# Patient Record
Sex: FEMALE | Race: OTHER | URBAN - METROPOLITAN AREA
[De-identification: names, ages, dates, MRNs, and addresses within clinical notes are randomized per-mention and may not be internally consistent; named-entity substitution may affect disease eponyms.]

---

## 2020-09-15 ENCOUNTER — NURSE ONLY (OUTPATIENT)
Dept: OBGYN CLINIC | Age: 25
End: 2020-09-15

## 2020-09-15 ENCOUNTER — HOSPITAL ENCOUNTER (OUTPATIENT)
Age: 25
Setting detail: SPECIMEN
Discharge: HOME | End: 2020-09-15
Payer: MEDICAID

## 2020-09-15 VITALS — DIASTOLIC BLOOD PRESSURE: 60 MMHG | SYSTOLIC BLOOD PRESSURE: 110 MMHG

## 2020-09-15 LAB
ABO/RH: NORMAL
ABSOLUTE EOS #: 0.05 K/UL (ref 0–0.44)
ABSOLUTE IMMATURE GRANULOCYTE: <0.03 K/UL (ref 0–0.3)
ABSOLUTE LYMPH #: 1.61 K/UL (ref 1.1–3.7)
ABSOLUTE MONO #: 0.61 K/UL (ref 0.1–1.2)
ANTIBODY SCREEN: NEGATIVE
BASOPHILS # BLD: 1 % (ref 0–2)
BASOPHILS ABSOLUTE: 0.04 K/UL (ref 0–0.2)
DIFFERENTIAL TYPE: ABNORMAL
EOSINOPHILS RELATIVE PERCENT: 1 % (ref 1–4)
HCT VFR BLD CALC: 40.4 % (ref 36.3–47.1)
HEMOGLOBIN: 13.1 G/DL (ref 11.9–15.1)
HEPATITIS B SURFACE ANTIGEN: NONREACTIVE
HIV AG/AB: NONREACTIVE
IMMATURE GRANULOCYTES: 0 %
LYMPHOCYTES # BLD: 21 % (ref 24–43)
MCH RBC QN AUTO: 29.8 PG (ref 25.2–33.5)
MCHC RBC AUTO-ENTMCNC: 32.4 G/DL (ref 28.4–34.8)
MCV RBC AUTO: 92 FL (ref 82.6–102.9)
MONOCYTES # BLD: 8 % (ref 3–12)
NRBC AUTOMATED: 0 PER 100 WBC
PDW BLD-RTO: 12.6 % (ref 11.8–14.4)
PLATELET # BLD: 287 K/UL (ref 138–453)
PLATELET ESTIMATE: ABNORMAL
PMV BLD AUTO: 10.6 FL (ref 8.1–13.5)
RBC # BLD: 4.39 M/UL (ref 3.95–5.11)
RBC # BLD: ABNORMAL 10*6/UL
RUBV IGG SER QL: 18.2 IU/ML
SEG NEUTROPHILS: 69 % (ref 36–65)
SEGMENTED NEUTROPHILS ABSOLUTE COUNT: 5.51 K/UL (ref 1.5–8.1)
T. PALLIDUM, IGG: NONREACTIVE
WBC # BLD: 7.8 K/UL (ref 3.5–11.3)
WBC # BLD: ABNORMAL 10*3/UL

## 2020-09-15 NOTE — PROGRESS NOTES
OB intake and lab work ordered. Hx of 17p injections in prior pregnancy, had a full term delivery. Hx of 2 SAB's. Delivered in [facility] in 2017, vag, full term. All history placed. 10w 6d on US today.     LMP possibly in June

## 2020-09-24 LAB — CYSTIC FIBROSIS: NORMAL

## 2020-10-07 ENCOUNTER — INITIAL PRENATAL (OUTPATIENT)
Dept: OBGYN CLINIC | Age: 25
End: 2020-10-07
Payer: MEDICAID

## 2020-10-07 ENCOUNTER — HOSPITAL ENCOUNTER (OUTPATIENT)
Age: 25
Setting detail: SPECIMEN
Discharge: HOME | End: 2020-10-07
Payer: MEDICAID

## 2020-10-07 VITALS
BODY MASS INDEX: 26.22 KG/M2 | HEIGHT: 63 IN | WEIGHT: 148 LBS | SYSTOLIC BLOOD PRESSURE: 112 MMHG | DIASTOLIC BLOOD PRESSURE: 60 MMHG

## 2020-10-07 LAB
AMPHETAMINE SCREEN URINE: NEGATIVE
BARBITURATE SCREEN URINE: NEGATIVE
BENZODIAZEPINE SCREEN, URINE: NEGATIVE
BILIRUBIN URINE: NEGATIVE
BUPRENORPHINE URINE: NORMAL
CANNABINOID SCREEN URINE: NEGATIVE
COCAINE METABOLITE, URINE: NEGATIVE
COLOR: YELLOW
COMMENT UA: NORMAL
GLUCOSE URINE: NEGATIVE
KETONES, URINE: NEGATIVE
LEUKOCYTE ESTERASE, URINE: NEGATIVE
MDMA URINE: NORMAL
METHADONE SCREEN, URINE: NEGATIVE
METHAMPHETAMINE, URINE: NORMAL
NITRITE, URINE: NEGATIVE
OPIATES, URINE: NEGATIVE
OXYCODONE SCREEN URINE: NEGATIVE
PH UA: 7.5 (ref 5–8)
PHENCYCLIDINE, URINE: NEGATIVE
PROPOXYPHENE, URINE: NORMAL
PROTEIN UA: NEGATIVE
SPECIFIC GRAVITY UA: 1 (ref 1–1.03)
TEST INFORMATION: NORMAL
TRICYCLIC ANTIDEPRESSANTS, UR: NORMAL
TURBIDITY: CLEAR
URINE HGB: NEGATIVE
UROBILINOGEN, URINE: NORMAL

## 2020-10-07 PROCEDURE — G8427 DOCREV CUR MEDS BY ELIG CLIN: HCPCS | Performed by: NURSE PRACTITIONER

## 2020-10-07 PROCEDURE — G8484 FLU IMMUNIZE NO ADMIN: HCPCS | Performed by: NURSE PRACTITIONER

## 2020-10-07 PROCEDURE — 1036F TOBACCO NON-USER: CPT | Performed by: NURSE PRACTITIONER

## 2020-10-07 PROCEDURE — 99203 OFFICE O/P NEW LOW 30 MIN: CPT | Performed by: NURSE PRACTITIONER

## 2020-10-07 PROCEDURE — G8419 CALC BMI OUT NRM PARAM NOF/U: HCPCS | Performed by: NURSE PRACTITIONER

## 2020-10-07 NOTE — PROGRESS NOTES
OB History    Para Term  AB Living   4 1 1   2 1   SAB TAB Ectopic Molar Multiple Live Births   2                # Outcome Date GA Lbr Guillermo/2nd Weight Sex Delivery Anes PTL Lv   4 Current            3 Term 2017    F          Birth Comments: [address] [facility]   2 SAB            1 SAB               Prudy Monday is being seen today for her new obstetrical visit- she had nurse intake completed at 10 weeks gestation with dating US. The pregnancy is  a planned pregnancy. She is  accepting at this time. Her last period was No LMP recorded (lmp unknown). Patient is pregnant. .  This was not a sure and definite menses. She was not on OCP at conception. Gestation 14w0d  Estimated Date of Delivery: 21  Last PAP 3 years ago, hx abnormal: denies    - Hx SAB X2. Delivered vaginal full term. In [facility]. Her and boyfriend and daughter moved her less than a year ago for his job.      HAd hx 17- P injections with previous pregnancy, she has consult scheduled with [facility] on 10/21/20            Plans to deliver at: unsure  Plans to breastfeed: no  SO/Partner:  Boyfriend- Sabinolian Involved:  Yes same FOB as their daughter  Patient Ethnicity:  FOB Ethnicity:       Occupation:  Does not work outside the home       Pets:  denies    Teratogen exposure since LMP: (OTC/prescription/ETOH/drugs):  denies    History of prior GBS-infected child:  no  Recent travel outside of country (partner also):  no    Known COVID/Zika exposure (partner also): no  Any history of genetic or chromosomal aberations in herself the father to be or their respective families: no  Any histories of spina bifida or abdominal wall defects; omphalocele's or gastroschisis no  Hx Hypothyroidism: deneis  Hx preeclampsia or HTN:  denies  Hx PPD: denies  Hx Diabetes: denies  Hx GDM: denies  First degree relative with diabetes: denies       No Known Allergies  Current Outpatient Medications   Medication Sig Dispense Refill    Prenatal Vit w/[id number] (PNV PO) Take by mouth       No current facility-administered medications for this visit. No past medical history on file. History reviewed. No pertinent surgical history. Swelling: no  Bleeding: no  Discharge: no   Dysuria: no  Nausea: no- has improved,  encouraged small frequent meals, and vitamin B6 and unisom if needed. Heartburn: no  Epigastric pain: no       Office protocol reviewed, After hours number provided. Diet and exercise reveiwed  Warning signs reviewed  Testing reviewed     Q&A  Refer no had declined  1st trimester screen, info provided for screening, Discussed dates and orders for Anatomy USd and fetal echo if indicated. Breastfeeding education. She verbally consented to HIV testing and drug screening. She was counseled on Toxoplasmosis/Listeriosis (cats/raw meat). Prenatal Vitamins recommended. Pelvic exam completed PAP and cultures obtained and sent. Prenatal labs and dating us completed- besides urine which was sent today        RV prn/ 4 weeks     Of the 30 minute duration appointment visit, Thierno Bryson CNP spent at least 50% of the face-to-face time in counseling, explanation of diagnosis, planning of further management, and answering all questions.

## 2020-10-07 NOTE — PATIENT INSTRUCTIONS
After Hours Number: You can call the office [phone]  or [phone]  Call if you have:  1. Bleeding like a period  2. Cramps or contractions greater than 2 hours  3. If you are leaking fluid  4. If you've a fever greater than 100°  5. If you feel as if baby is not moving  6. If you have continuous vomiting over 3-4 hours        Patient was counseled on weight gain in pregnancy with the following recommendation made based on her BMI:     Singletons:  BMI <18.5: 28-40 lbs weight gain  BMI 18.5-24.9: 25-35 lbs weight gain   BMI 25-29.9: 15-25 lbs weight gain  BMI >/= 30: 11-20 lbs weight gain    Up to 16 weeks around 1 pound a month  16-28 weeks- 2-4 pounds a month  >28 weeks - around 1 pound a week due to increased growth and blood volume      Twins:  BMI <18.5: no reccomendations  BMI 18.5-24.9: 37-45 lbs weight gain  BMI 25-29.9: 31-50 lbs weight gain  BMI >/= 30: 25-42 lbs weight gain    During Pregnancy: Exercises  Your Care Instructions  Here are some examples of exercises to do during your pregnancy. Start each exercise slowly. Ease off the exercise if you start to have pain. Your doctor or physical therapist will tell you when you can start these exercises and which ones will work best for you. How to do the exercises  Neck rotation    1. Sit in a firm chair, or stand up straight. 2. Keeping your chin level, turn your head to the right, and hold for 15 to 30 seconds. 3. Turn your head to the left and hold for 15 to 30 seconds. 4. Repeat 2 to 4 times to each side. Forward neck flexion    1. Sit in a firm chair, or stand up straight. 2. Bend your head forward. 3. Hold for 15 to 30 seconds. 4. Repeat 2 to 4 times. Back press    1. Place your feet 10 to 12 inches from the wall. 2. Rest your back flat against the wall and slide down the wall until your knees are slightly bent. 3. Press your lower back against the wall by pulling in your stomach muscles.   4. Hold for 6 seconds, and then relax your stomach muscles and slide back up the wall. 5. Repeat 8 to 12 times. Full body twist    1. Sit with your legs crossed. 2. Reach your left hand toward your right foot, and place your right hand at your side for support. 3. Slowly twist your torso to your right. 4. Switch your hands and twist to your left. 5. Repeat 2 to 4 times. Pelvic rocking    1. Kneeling on hands and knees, place your hands directly under your shoulders and your knees under your hips. 2. Breathe in deeply. Tuck your head downward and round your back up, making a curve with your back in the shape of the letter C. Hold this position for a count of 6.  3. Breathe out slowly and bring your head back up. Relax, keeping your back straight (don't allow it to curve toward the floor). Hold this for a count of 6.  4. Do this exercise 8 times or to your comfort level. Pelvic tilt    1. Lie on your back. 2. Keep your knees relaxed. 3. Tighten your belly and buttocks muscles. 4. At the same time, gently shift your pelvis upward. This should flatten the curve in your back. 5. Hold for 6 seconds and then relax. 6. Gradually increase the number of tilts you do each day, to your comfort level. Backward stretch    1. Kneel on hands and knees with your knees 8 to 10 inches apart, hands directly under your shoulders, and arms and back straight. 2. Keeping your arms straight, slowly lower your buttocks toward your heels and tuck your head toward your knees. Hold for 15 to 30 seconds. 3. Slowly return to the kneeling position. 4. Repeat 2 to 4 times. Forward bend    1. Sit comfortably in a chair, with your arms relaxed. 2. Slowly bend forward, allowing your arms to hang down in front of you. Lean only as far as you can without feeling discomfort or pressure on your belly. 3. Hold for 15 to 30 seconds and then slowly sit up straight. 4. Repeat 2 to 4 times or to your comfort level. Leg lift crawl    1.  Kneeling on hands and knees, place your hands directly under your shoulders and straighten your arms. 2. Tighten your belly muscles by pulling in your belly button toward your spine. Be sure you continue to breathe normally and do not hold your breath. 3. Lift your left knee and bring it toward your elbow. 4. Slowly extend your leg behind you without completely straightening it. Be careful not to let your hip drop down. Avoid arching your back. 5. Hold your leg behind you for about 6 seconds. 6. Return to your starting position. 7. Do the same exercise with your other leg. 8. Repeat 8 to 12 times for each leg. Tailor sitting    1. Sit on the floor. 2. Bring your feet close to your body while crossing your ankles. 3. Hold this position for as long as you are comfortable. Tailor stretching    1. Sit on the floor with your back straight, legs about 12 inches apart, and feet relaxed outward. 2. Stretch your hands forward toward your left foot, then sit up. 3. Stretch your hands straight forward, then sit up. 4. Stretch your hands forward toward your right foot, then sit up. 5. Hold each stretch for 15 to 30 seconds. 6. Repeat 2 to 4 times. Follow-up care is a key part of your treatment and safety. Be sure to make and go to all appointments, and call your doctor if you are having problems. It's also a good idea to know your test results and keep a list of the medicines you take. Where can you learn more? Go to https://Voxel (Internap)zairaMarketLive.Physician Practice Revenue Solutions. org and sign in to your [employer] account. Enter S779 in the [facility] box to learn more about \"During Pregnancy: Exercises. \"     If you do not have an account, please click on the \"Sign Up Now\" link. Current as of: September 5, 2018  Content Version: 12.0  © 2647-5883 Healthwise, Incorporated. Care instructions adapted under license by [facility] ([facility]).  If you have questions about a medical condition or this instruction, always ask your healthcare professional. [employer], [facility] disclaims any warranty or liability for your use of this information. Nutrition During Pregnancy: Care Instructions  Your Care Instructions    Healthy eating when you are pregnant is important for you and your baby. It can help you feel well and have a successful pregnancy and delivery. During pregnancy your nutrition needs increase. Even if you have excellent eating habits, your doctor may recommend a multivitamin to make sure you get enough iron and folic acid. Many pregnant women wonder how much weight they should gain. In general, women who were at a healthy weight before they became pregnant should gain between 25 and 35 pounds. Women who were overweight before pregnancy are usually advised to gain 15 to 25 pounds. Women who were underweight before pregnancy are usually advised to gain 28 to 40 pounds. Your doctor will work with you to set a weight goal that is right for you. Gaining a healthy amount of weight helps you have a healthy baby. Follow-up care is a key part of your treatment and safety. Be sure to make and go to all appointments, and call your doctor if you are having problems. It's also a good idea to know your test results and keep a list of the medicines you take. How can you care for yourself at home? · Eat plenty of fruits and vegetables. Include a variety of orange, yellow, and leafy dark-green vegetables every day. · Choose whole-grain bread, cereal, and pasta. Good choices include whole wheat bread, whole wheat pasta, brown rice, and oatmeal.  · Get 4 or more servings of milk and milk products each day. Good choices include nonfat or low-fat milk, yogurt, and cheese. If you cannot eat milk products, you can get calcium from calcium-fortified products such as orange juice, soy milk, and tofu. Other non-milk sources of calcium include leafy green vegetables, such as broccoli, kale, mustard greens, turnip greens, bok levi, and brussels sprouts.   · If you eat meat, pick lower-fat types. Good choices include lean cuts of meat and chicken or turkey without the skin. · Do not eat shark, swordfish, jacky mackerel, or tilefish. They have high levels of mercury, which is dangerous to your baby. You can eat up to 12 ounces a week of fish or shellfish that have low mercury levels. Good choices include shrimp, wild salmon, pollock, and catfish. Do not eat more than 6 ounces of tuna each week. · Heat lunch meats (such as turkey, ham, or bologna) to 165°F before you eat them. This reduces your risk of getting sick from a kind of bacteria that can be found in lunch meats. · Do not eat unpasteurized soft cheeses, such as brie, feta, fresh mozzarella, and blue cheese. They have a bacteria that could harm your baby. · Limit caffeine. If you drink coffee or tea, have no more than 1 cup a day. Caffeine is also found in kamila. · Do not drink any alcohol. No amount of alcohol has been found to be safe during pregnancy. · Do not diet or try to lose weight. For example, do not follow a low-carbohydrate diet. If you are overweight at the start of your pregnancy, your doctor will work with you to manage your weight gain. · Tell your doctor about all vitamins and supplements you take. When should you call for help? Watch closely for changes in your health, and be sure to contact your doctor if you have any problems. Where can you learn more? Go to https://CivicSciencegabeCOMS Interactive.healthManifest Digital. org and sign in to your [employer] account. Enter Y785 in the Clickslide box to learn more about \"Nutrition During Pregnancy: Care Instructions. \"     If you do not have an account, please click on the \"Sign Up Now\" link. Current as of: September 5, 2018  Content Version: 12.0  © 9422-4063 Healthwise, Incorporated. Care instructions adapted under license by [facility] ([facility]).  If you have questions about a medical condition or this instruction, always ask your healthcare professional. [employer], [employer] disclaims any warranty or liability for your use of this information. Managing Morning Sickness: Care Instructions  Your Care Instructions    For many women, the toughest part of early pregnancy is morning sickness. Morning sickness can range from mild nausea to severe nausea with bouts of vomiting. Symptoms may be worse in the morning, although they can strike at any time of the day or night. If you have nausea, vomiting, or both, look for safe measures that can bring you relief. You can take simple steps at home to manage morning sickness. These steps include changing what and when you eat and avoiding certain foods and smells. Some women find that acupuncture and acupressure wristbands also help. Follow-up care is a key part of your treatment and safety. Be sure to make and go to all appointments, and call your doctor if you are having problems. It's also a good idea to know your test results and keep a list of the medicines you take. How can you care for yourself at home? · Keep food in your stomach, but not too much at once. Your nausea may be worse if your stomach is empty. Eat five or six small meals a day instead of three large meals. · For morning nausea, eat a small snack, such as a couple of crackers or dry biscuits, before rising. Allow a few minutes for your stomach to settle before you get out of bed slowly. · Drink plenty of fluids, enough so that your urine is light yellow or clear like water. If you have kidney, heart, or liver disease and have to limit fluids, talk with your doctor before you increase the amount of fluids you drink. Some women find that peppermint tea helps with nausea. · Eat more protein, such as chicken, fish, lean meat, beans, nuts, and seeds. · Eat carbohydrate foods, such as potatoes, whole-grain cereals, rice, and pasta. · Avoid smells and foods that make you feel nauseated.  Spicy or high-fat foods, citrus juice, milk, coffee, and tea with any warranty or liability for your use of this information. Breastfeeding: Care Instructions  Overview      Breastfeeding has many benefits. It may lower your baby's chances of getting an infection. It also may make it less likely that your baby will have problems such as diabetes and obesity later in life. Breastfeeding also helps you bond with your baby. In the first days after birth, your breasts make a thick, yellow liquid called colostrum. This liquid gives your baby nutrients and antibodies against infection. It is all that babies need in the first days after birth. Your breasts will fill with milk a few days after the birth. Breastfeeding is a skill that gets better with practice. Be patient with yourself and your baby. If you have trouble, you can get help and keep breastfeeding. Follow-up care is a key part of your treatment and safety. Be sure to make and go to all appointments, and call your doctor if you are having problems. It's also a good idea to know your test results and keep a list of the medicines you take. How can you care for yourself at home? · Breastfeed your baby whenever he or she is hungry. In the first 2 weeks, your baby will feed about every 1 to 3 hours. That often works out to about 8 to 12 times in a 24-hour period. This will help you keep up your supply of milk. Signs that your baby is hungry include:  ? Sucking on his or her hands. ? [place] his or her lips. ? Turning his or her head toward your breast.  · Put a bed pillow or a nursing pillow on your lap to support your arms and your baby. · Hold your baby in a comfortable position. ? You can hold your baby in several ways. One of the most common positions is the cradle hold. One arm supports your baby, with his or her head in the bend of your elbow. Your open hand supports your baby's bottom or back. Your baby's belly lies against yours. ? If you had your baby by , or , try the football hold.  This position keeps your baby off your belly. Tuck your baby under your arm, with his or her body along the side you will be feeding on. Support your baby's upper body with your arm. With that hand you can control your baby's head to bring his or her mouth to your breast.  ? Try different positions with each feeding. If you are having problems, ask for help from your doctor or a lactation consultant. · To get your baby to latch on:  ? Support and narrow your breast with one hand using a \"U hold,\" with your thumb on the outer side of your breast and your fingers on the inner side. You can also use a \"C hold,\" with all your fingers below the nipple and your thumb above it. Try the different holds to get the deepest latch for whichever breastfeeding position you use. Your other arm is behind your baby's back, with your hand supporting the base of the baby's head. Position your fingers and thumb to point toward your baby's ears. ? You can touch your baby's lower lip with your nipple to get your baby to open his or her mouth. Wait until your baby opens up really wide, like a big yawn. Then be sure to bring the baby quickly to your breast--not your breast to the baby. As you bring your baby toward your breast, use your other hand to support the breast and guide it into his or her mouth. ? Both the nipple and a large portion of the darker area around the nipple (areola) should be in the baby's mouth. The baby's lips should be flared outward, not folded in (inverted). ? Listen for a regular sucking and swallowing pattern while the baby is feeding. If you cannot see or hear a swallowing pattern, watch the baby's ears, which will wiggle slightly when the baby swallows. If the baby's nose appears to be blocked by your breast, bring your baby's body closer to you. This will help tilt the baby's head back slightly, so just the edge of one nostril is clear for breathing. ?  When your baby is latched, you can usually remove your hand from supporting your breast and bring it under your baby to cradle him or her. Now just relax and breastfeed your baby. · You will know that your baby is feeding well when:  ? His or her mouth covers a lot of the areola, and the lips are flared out.  ? His or her chin and nose rest against your breast.  ? Sucking is deep and rhythmic, with short pauses. ? You are able to see and hear your baby swallowing. ? You do not feel pain in your nipple. · Offer both breasts to your baby at each feeding. Each time you breastfeed, switch which breast you start with. · Anytime you need to remove your baby from the breast, put one finger in the corner of his or her mouth. Push your finger between your baby's gums to gently break the seal. If you do not break the tight seal before you remove your baby, your nipples can become sore, cracked, or bruised. · After feeding your baby, gently pat his or her back to let out any swallowed air. After your baby burps, offer the breast again, or offer the other breast. Sometimes a baby will want to keep feeding after being burped. When should you call for help? Call your doctor now or seek immediate medical care if:    · You have symptoms of a breast infection, such as:  ? Increased pain, swelling, redness, or warmth around a breast.  ? Red streaks extending from the breast.  ? Pus draining from a breast.  ? A fever. · Your baby has no wet diapers for 6 hours. Watch closely for changes in your health, and be sure to contact your doctor if:    · Your baby has trouble latching on to your breast.     · You continue to have pain or discomfort when breastfeeding. · You have other questions or concerns. Where can you learn more? Go to https://MMIC SolutionsgabePoachable.Gatheredtable. org and sign in to your E-Blink account. Enter P492 in the Ne[employer]onie box to learn more about \"Breastfeeding: Care Instructions. \"     If you do not have an account, please click on the \"Sign Up Now\" link. Current as of: September 5, 2018  Content Version: 12.0  © 8730-5217 [employer]. Care instructions adapted under license by [facility] ([facility]). If you have questions about a medical condition or this instruction, always ask your healthcare professional. [address] any warranty or liability for your use of this information. Learning About Birth Defects Testing  What is birth defects testing? Birth defects testing is done during pregnancy to look for possible problems with the baby (fetus). A birth defect may have only a minor impact on a child's life. Or it can have a major effect on quality or length of life. You and your doctor can choose from many tests. You may have no tests, one test, or many tests. Talking with a genetic counselor may help you make decisions about testing. The counselor is trained to help you understand these tests. He or she can also help you find resources for support. What are the types of tests? You may have a screening test or a diagnostic test. Or you may have both types of tests. Screening tests show the chance that a baby has a certain birth defect, such as Down syndrome, spina bifida, or trisomy 25. Diagnostic tests show if a baby has a certain birth defect. · Screening tests and when they are done:  ? Blood tests at 10 to 13 weeks (first trimester)  ? Cell free fetal DNA test at 10 weeks or later (first trimester)  ? Nuchal translucency test at 11 to 14 weeks (first trimester)  ? Triple or quad screening at 15 to 20 weeks (second trimester)  ? Ultrasound at 18 to 20 weeks (second trimester)  · Diagnostic tests and when they are done:  ? Chorionic villus sampling (CVS) at 10 to 13 weeks (first trimester)  ? Amniocentesis at 13 to 20 weeks (second trimester)  In some cases, the doctors look at the combined screenings that you've had over a period of time. This is called an integrated screening.   What are the screening tests?  · Blood tests are done to look at different substances in your blood. These tests include cell free fetal DNA and triple or quadruple (quad) blood tests. Both of these tests can help find genetic problems. · Nuchal translucency test uses ultrasound to measure the thickness of the area at the back of the baby's neck. An increase in thickness can be an early sign of certain birth defects. Ultrasound is a tool that uses sound waves to make pictures of your baby and placenta inside the uterus. · Ultrasound lets your doctor see an image of your baby. It can help your doctor look for problems of the heart, spine, belly, or other areas. What are the diagnostic tests? Chorionic villus sampling (CVS) looks at cells from the placenta. To do the test, your doctor may put a thin tube through your vagina and cervix to take out a small piece of the placenta. Or the doctor may take out the piece through a needle in your belly. This test can diagnose many genetic diseases. But it can't find problems with the spinal cord. Amniocentesis looks at the amniotic fluid that surrounds your baby. Your doctor will put a needle through your belly into your uterus and take out a very small amount of fluid to test.  What are the risks of these tests? There is a small risk of a miscarriage after a CVS or amniocentesis. Your doctor or genetic counselor can help you understand this risk. These tests are generally very safe. Where can you learn more? Go to https://Walk-inpeEdfa3ly.The Bond[employer] Company. org and sign in to your CTSpace account. Enter G030 in the [facility] box to learn more about \"Learning About Birth Defects Testing. \"     If you do not have an account, please click on the \"Sign Up Now\" link. Current as of: September 5, 2018  Content Version: 12.0  © 6079-1704 Healthwise, Incorporated. Care instructions adapted under license by [facility] ([facility]).  If you have questions about a medical condition or this instruction,

## 2020-10-08 LAB
CULTURE: NO GROWTH
DIRECT EXAM: NORMAL
Lab: NORMAL
Lab: NORMAL
SPECIMEN DESCRIPTION: NORMAL
SPECIMEN DESCRIPTION: NORMAL

## 2020-10-16 LAB — CYTOLOGY REPORT: NORMAL

## 2020-10-21 ENCOUNTER — TELEPHONE (OUTPATIENT)
Dept: PERINATAL CARE | Age: 25
End: 2020-10-21

## 2020-10-21 ENCOUNTER — ROUTINE PRENATAL (OUTPATIENT)
Dept: PERINATAL CARE | Age: 25
End: 2020-10-21
Payer: MEDICAID

## 2020-10-21 VITALS
HEART RATE: 110 BPM | RESPIRATION RATE: 16 BRPM | TEMPERATURE: 97.3 F | DIASTOLIC BLOOD PRESSURE: 70 MMHG | HEIGHT: 63 IN | SYSTOLIC BLOOD PRESSURE: 109 MMHG | BODY MASS INDEX: 26.58 KG/M2 | WEIGHT: 150 LBS

## 2020-10-21 PROCEDURE — 99243 OFF/OP CNSLTJ NEW/EST LOW 30: CPT | Performed by: OBSTETRICS & GYNECOLOGY

## 2020-10-21 PROCEDURE — G8484 FLU IMMUNIZE NO ADMIN: HCPCS | Performed by: OBSTETRICS & GYNECOLOGY

## 2020-10-21 PROCEDURE — 76817 TRANSVAGINAL US OBSTETRIC: CPT | Performed by: OBSTETRICS & GYNECOLOGY

## 2020-10-21 PROCEDURE — G8427 DOCREV CUR MEDS BY ELIG CLIN: HCPCS | Performed by: OBSTETRICS & GYNECOLOGY

## 2020-10-21 PROCEDURE — G8419 CALC BMI OUT NRM PARAM NOF/U: HCPCS | Performed by: OBSTETRICS & GYNECOLOGY

## 2020-10-21 PROCEDURE — 76805 OB US >/= 14 WKS SNGL FETUS: CPT | Performed by: OBSTETRICS & GYNECOLOGY

## 2020-10-21 NOTE — TELEPHONE ENCOUNTER
Rx [facility] into the [facility] ([facility] )  for vaginal progesterone suppositories/capsules 200 mg # 30 insert one into the vagina nightly with 4 refills, Rx called in by [facility] per Dr. Didi Brown

## 2020-10-22 LAB
CHLAMYDIA BY THIN PREP: NEGATIVE
N. GONORRHOEAE DNA, THIN PREP: NEGATIVE
SPECIMEN DESCRIPTION: NORMAL

## 2020-10-23 ENCOUNTER — HOSPITAL ENCOUNTER (OUTPATIENT)
Age: 25
Setting detail: SPECIMEN
Discharge: HOME | End: 2020-10-23
Payer: MEDICAID

## 2020-10-23 LAB
SEND OUT REPORT: NORMAL
SEND OUT REPORT: NORMAL
TEST NAME: NORMAL
TEST NAME: NORMAL

## 2020-10-23 PROCEDURE — 36415 COLL VENOUS BLD VENIPUNCTURE: CPT

## 2020-10-23 PROCEDURE — 82105 ALPHA-FETOPROTEIN SERUM: CPT

## 2020-10-26 LAB
AFP INTERPRETATION: NORMAL
AFP MOM: 0.62
AFP SPECIMEN: NORMAL
AFP: 21 NG/ML
DATE OF BIRTH: NORMAL
DATING METHOD: NORMAL
DETERMINED BY: NORMAL
DIABETIC: NEGATIVE
DUE DATE: NORMAL
ESTIMATED DUE DATE: NORMAL
FAMILY HISTORY NTD: NEGATIVE
GESTATIONAL AGE: NORMAL
INSULIN REQ DIABETES: NO
LAST MENSTRUAL PERIOD: NORMAL
MATERNAL AGE AT EDD: 27.4 YR
MATERNAL WEIGHT: 150
MONOCHORIONIC TWINS: NORMAL
NUMBER OF FETUSES: NORMAL
PATIENT WEIGHT UNITS: NORMAL
PATIENT WEIGHT: NORMAL
RACE (MATERNAL): NORMAL
RACE: NORMAL
REPEAT SPECIMEN?: NORMAL
SMOKING: NORMAL
SMOKING: NORMAL
VALPROIC/CARBAMAZEP: NORMAL
ZZ NTE CLEAN UP: HISTORY: NO

## 2020-11-02 ENCOUNTER — ROUTINE PRENATAL (OUTPATIENT)
Dept: OBGYN CLINIC | Age: 25
End: 2020-11-02
Payer: MEDICAID

## 2020-11-02 VITALS
BODY MASS INDEX: 27.15 KG/M2 | SYSTOLIC BLOOD PRESSURE: 110 MMHG | DIASTOLIC BLOOD PRESSURE: 62 MMHG | WEIGHT: 153.25 LBS

## 2020-11-02 PROCEDURE — 99213 OFFICE O/P EST LOW 20 MIN: CPT | Performed by: OBSTETRICS & GYNECOLOGY

## 2020-11-02 NOTE — PROGRESS NOTES
Anjali Roche is a 30 y.o. female 17w5d        OB History    Para Term  AB Living   4 1 1   2 1   SAB TAB Ectopic Molar Multiple Live Births   2                # Outcome Date GA Lbr Guillermo/2nd Weight Sex Delivery Anes PTL Lv   4 Current            3 Term 2017    F          Birth Comments: [address] [facility]   2 SAB            1 SAB                      Vitals  BP: 110/62  Weight: 153 lb 4 oz (69.5 kg)  Patient Position: Sitting      The patient was seen and evaluated. There was Positive fetal movements. No contractions or leakage of fluid. Signs and symptoms of  labor as well as labor were reviewed. The Nuchal Translucency testing was reviewed and found to be out of window. A single marker MSAFP was ordered for a 15-20 week gestational age window. TOP ST OH Reviewed. Dates were reviewed with the patient. An 18-22 week anatomy ultrasound has been ordered. The patient will return to the office for her next visit in 4 weeks. See antepartum flow sheet. History of miscarriages (2)  CL per MFM  Recommend progesterone 17-P (history of 21 week spontaneous  delivery            Assessment:  1. Anjali Roche is a 30 y.o. female  2. [id number]  3. 17w5d    There are no active problems to display for this patient. Diagnosis Orders   1. Encounter for supervision of other normal pregnancy in second trimester     2. 17 weeks gestation of pregnancy           Plan:  RTO 4 weeks  CL q2 weeks MFM  Anatomy ultrasound scheduled                                        Patient was seen with total face to face time of 15 minutes. More than 50% of this visit was on counseling and education regarding her    Diagnosis Orders   1. Encounter for supervision of other normal pregnancy in second trimester     2. 17 weeks gestation of pregnancy      and her options. She was also counseled on her preventative health maintenance recommendations and follow-up.

## 2020-11-04 ENCOUNTER — ROUTINE PRENATAL (OUTPATIENT)
Dept: PERINATAL CARE | Age: 25
End: 2020-11-04
Payer: MEDICAID

## 2020-11-04 VITALS
HEIGHT: 63 IN | HEART RATE: 98 BPM | SYSTOLIC BLOOD PRESSURE: 104 MMHG | DIASTOLIC BLOOD PRESSURE: 59 MMHG | TEMPERATURE: 97.2 F | BODY MASS INDEX: 27.29 KG/M2 | WEIGHT: 154 LBS | RESPIRATION RATE: 16 BRPM

## 2020-11-04 PROCEDURE — 76815 OB US LIMITED FETUS(S): CPT | Performed by: OBSTETRICS & GYNECOLOGY

## 2020-11-04 PROCEDURE — 76817 TRANSVAGINAL US OBSTETRIC: CPT | Performed by: OBSTETRICS & GYNECOLOGY

## 2020-11-18 ENCOUNTER — ROUTINE PRENATAL (OUTPATIENT)
Dept: PERINATAL CARE | Age: 25
End: 2020-11-18
Payer: MEDICAID

## 2020-11-18 VITALS
HEART RATE: 86 BPM | RESPIRATION RATE: 16 BRPM | HEIGHT: 63 IN | BODY MASS INDEX: 27.82 KG/M2 | SYSTOLIC BLOOD PRESSURE: 111 MMHG | WEIGHT: 157 LBS | DIASTOLIC BLOOD PRESSURE: 55 MMHG | TEMPERATURE: 97.3 F

## 2020-11-18 PROCEDURE — 76811 OB US DETAILED SNGL FETUS: CPT | Performed by: OBSTETRICS & GYNECOLOGY

## 2020-11-18 PROCEDURE — 76817 TRANSVAGINAL US OBSTETRIC: CPT | Performed by: OBSTETRICS & GYNECOLOGY

## 2020-11-18 RX ORDER — HYDROXYPROGESTERONE CAPROATE 250 MG/ML
250 INJECTION INTRAMUSCULAR
COMMUNITY

## 2020-11-30 ENCOUNTER — ROUTINE PRENATAL (OUTPATIENT)
Dept: OBGYN CLINIC | Age: 25
End: 2020-11-30
Payer: MEDICAID

## 2020-11-30 VITALS
SYSTOLIC BLOOD PRESSURE: 112 MMHG | BODY MASS INDEX: 28.96 KG/M2 | TEMPERATURE: 97.9 F | DIASTOLIC BLOOD PRESSURE: 64 MMHG | RESPIRATION RATE: 16 BRPM | WEIGHT: 163.5 LBS

## 2020-11-30 PROCEDURE — 99213 OFFICE O/P EST LOW 20 MIN: CPT | Performed by: NURSE PRACTITIONER

## 2020-11-30 PROCEDURE — 90686 IIV4 VACC NO PRSV 0.5 ML IM: CPT | Performed by: NURSE PRACTITIONER

## 2020-11-30 PROCEDURE — G8419 CALC BMI OUT NRM PARAM NOF/U: HCPCS | Performed by: NURSE PRACTITIONER

## 2020-11-30 PROCEDURE — 1036F TOBACCO NON-USER: CPT | Performed by: NURSE PRACTITIONER

## 2020-11-30 PROCEDURE — G8427 DOCREV CUR MEDS BY ELIG CLIN: HCPCS | Performed by: NURSE PRACTITIONER

## 2020-11-30 PROCEDURE — 90471 IMMUNIZATION ADMIN: CPT | Performed by: NURSE PRACTITIONER

## 2020-11-30 PROCEDURE — G8484 FLU IMMUNIZE NO ADMIN: HCPCS | Performed by: NURSE PRACTITIONER

## 2020-11-30 NOTE — PROGRESS NOTES
to one hour with a target value of ten movements. She was instructed to notify the office if she did not make that target after two attempts or if after any attempt there was less than four movements. After hour numbers reviewed , along with labor signs if indicated. Contractions/cramping between 5-7 minutes and persisting even with attempts of increased water and laying on side. Fluid leakage, bleeding  NST information given no    The patient was counseled on the mandatory call ahead policy. She has been instructed to call the office at anytime prior to going into the hospital so the on-call physician may direct her to the appropriate facility for care. Exceptions were reviewed including but not limited to: Decreased fetal movement, vaginal Bleeding or hemorrhage, trauma, readily expectant delivery, or any instance where she feels 911 should be utilized. Of the 15 minute duration appointment visit, Shane Snider CNP spent at least 50% of the face-to-face time in counseling, explanation of diagnosis, planning of further management, and answering all questions.

## 2020-11-30 NOTE — PATIENT INSTRUCTIONS
Patient Education      After Hours Number: You can call the office [phone]  or [phone]  Call if you have:  1. Bleeding like a period  2. Cramps or contractions greater than 2 hours  3. If you are leaking fluid  4. If you've a fever greater than 100°  5. If you feel as if baby is not moving  6. If you have continuous vomiting over 3-4 hours   Weeks 18 to 22 of Your Pregnancy: Care Instructions  Your Care Instructions     Your baby is continuing to develop quickly. At this stage, babies can now suck their thumbs,  firmly with their hands, and open and close their eyelids. Sometime between 18 and 22 weeks, you will start to feel your baby move. At first, these small fetal movements feel like fluttering or \"butterflies. \" Some women say that they feel like gas bubbles. As the baby grows, these movements will become stronger. You may also notice that your baby kicks and hiccups. During this time, you may find that your nausea and fatigue are gone. Overall, you may feel better and have more energy than you did in your first trimester. But you may also have new discomforts now, such as sleep problems or leg cramps. This care sheet can help you ease these discomforts. Follow-up care is a key part of your treatment and safety. Be sure to make and go to all appointments, and call your doctor if you are having problems. It's also a good idea to know your test results and keep a list of the medicines you take. How can you care for yourself at home? Ease sleep problems  · Avoid caffeine in drinks or chocolate late in the day. · Get some exercise every day. · Take a warm shower or bath before bed. · Have a light snack or glass of milk at bedtime. · Do relaxation exercises in bed to calm your mind and body. · Support your legs and back with extra pillows. Try a pillow between your legs if you sleep on your side. · Do not use sleeping pills or alcohol. They could harm your baby.   Ease leg cramps  · Do not massage your calf during the cramp. · Sit on a firm bed or chair. Straighten your leg, and bend your foot (flex your ankle) slowly upward, toward your knee. Bend your toes up and down. · Stand on a cool, flat surface. Stretch your toes upward, and take small steps walking on your heels. · Use a heating pad or hot water bottle to help with muscle ache. Prevent leg cramps  · Be sure to get enough calcium. If you are worried that you are not getting enough, talk to your doctor. · Exercise every day, and stretch your legs before bed. · Take a warm bath before bed, and try leg warmers at night. Where can you learn more? Go to https://American-Albanian Hemp CompanypeInventiceb.Rad. org and sign in to your Tremor Video account. Enter B933 in the TripleLift box to learn more about \"Weeks 18 to 22 of Your Pregnancy: Care Instructions. \"     If you do not have an account, please click on the \"Sign Up Now\" link. Current as of: February 11, 2020               Content Version: 12.6  © 7993-6125 [employer]. Care instructions adapted under license by [facility] ([facility]). If you have questions about a medical condition or this instruction, always ask your healthcare professional. [place] [address] any warranty or liability for your use of this information. Patient Education        Back Pain During Pregnancy: Care Instructions  Overview     Back pain has many possible causes. It is often caused by problems with muscles and ligaments in your back. The extra weight during pregnancy can put stress on your back. Moving, lifting, standing, sitting, or sleeping in an awkward way also can strain your back. Back pain can also be a sign of labor. Although it may hurt a lot, back pain often improves on its own. Use good home treatment, and take care not to stress your back. Follow-up care is a key part of your treatment and safety.  Be sure to make and go to all appointments, and call your doctor if you are having problems. It's also a good idea to know your test results and keep a list of the medicines you take. How can you care for yourself at home? · Ask your doctor about taking acetaminophen (Tylenol) for pain. Do not take aspirin, ibuprofen (Advil, Motrin), or naproxen (Aleve). · Do not take two or more pain medicines at the same time unless the doctor told you to. Many pain medicines have acetaminophen, which is Tylenol. Too much acetaminophen (Tylenol) can be harmful. · Lie on your side with your knees and hips bent and a pillow between your legs. This reduces stress on your back. · Put ice or cold packs on your back for 10 to 20 minutes at a time, several times a day. Put a thin cloth between the ice and your skin. · Warm baths may also help reduce pain. · Change positions every 30 minutes. Take breaks if you must sit for a long time. Get up and walk around. · Ask your doctor about how much exercise you can do. You may feel better taking short walks or doing gentle movements and stretching in a swimming pool. · Ask your doctor about exercises to stretch and strengthen your back. When should you call for help? Call your doctor now or seek immediate medical care if:    · You think you are in labor.     · You have new numbness in your buttocks, genital or rectal areas, or legs.     · You have a new loss of bowel or bladder control. Watch closely for changes in your health, and be sure to contact your doctor if:    · You do not get better as expected. Where can you learn more? Go to https://Autonomic Networksgabriel.Vistaar. org and sign in to your Pascal [employer]rics account. Enter Q536 in the Mindbloom box to learn more about \"Back Pain During Pregnancy: Care Instructions. \"     If you do not have an account, please click on the \"Sign Up Now\" link. Current as of: February 11, 2020               Content Version: 12.6  © 8806-4731 [employer].    Care instructions adapted under license by [facility] ([facility]). If you have questions about a medical condition or this instruction, always ask your healthcare professional. [address] any warranty or liability for your use of this information. Patient Education        Sciatica: Exercises  Introduction  Here are some examples of typical rehabilitation exercises for your condition. Start each exercise slowly. Ease off the exercise if you start to have pain. Your doctor or physical therapist will tell you when you can start these exercises and which ones will work best for you. When you are not being active, find a comfortable position for rest. Some people are comfortable on the floor or a medium-firm bed with a small pillow under their head and another under their knees. Some people prefer to lie on their side with a pillow between their knees. Don't stay in one position for too long. Take short walks (10 to 20 minutes) every 2 to 3 hours. Avoid slopes, hills, and stairs until you feel better. Walk only distances you can manage without pain, especially leg pain. How to do the exercises  Back stretches   1. Get down on your hands and knees on the floor. 2. Relax your head and allow it to droop. Round your back up toward the ceiling until you feel a nice stretch in your upper, middle, and lower back. Hold this stretch for as long as it feels comfortable, or about 15 to 30 seconds. 3. Return to the starting position with a flat back while you are on your hands and knees. 4. Let your back sway by pressing your stomach toward the floor. Lift your buttocks toward the ceiling. 5. Hold this position for 15 to 30 seconds. 6. Repeat 2 to 4 times. Follow-up care is a key part of your treatment and safety. Be sure to make and go to all appointments, and call your doctor if you are having problems. It's also a good idea to know your test results and keep a list of the medicines you take. Where can you learn more?   Go to https://chpepiceweb.healthHealthvest [email or web address] org and sign in to your Mensajeros Urbanoshart account. Enter Y580 in the Box Score Gameshire box to learn more about \"Sciatica: Exercises. \"     If you do not have an account, please click on the \"Sign Up Now\" link. Current as of: March 2, 2020               Content Version: 12.6  © 5448-6134 [employer]. Care instructions adapted under license by [facility] ([facility]). If you have questions about a medical condition or this instruction, always ask your healthcare professional. [address] any warranty or liability for your use of this information.

## 2020-12-04 ENCOUNTER — ROUTINE PRENATAL (OUTPATIENT)
Dept: PERINATAL CARE | Age: 25
End: 2020-12-04
Payer: MEDICAID

## 2020-12-04 VITALS
HEIGHT: 63 IN | TEMPERATURE: 97.1 F | DIASTOLIC BLOOD PRESSURE: 57 MMHG | WEIGHT: 158 LBS | RESPIRATION RATE: 16 BRPM | BODY MASS INDEX: 28 KG/M2 | HEART RATE: 72 BPM | SYSTOLIC BLOOD PRESSURE: 97 MMHG

## 2020-12-04 PROCEDURE — 76817 TRANSVAGINAL US OBSTETRIC: CPT | Performed by: OBSTETRICS & GYNECOLOGY

## 2020-12-04 PROCEDURE — 76815 OB US LIMITED FETUS(S): CPT | Performed by: OBSTETRICS & GYNECOLOGY

## 2020-12-30 ENCOUNTER — ROUTINE PRENATAL (OUTPATIENT)
Dept: OBGYN CLINIC | Age: 25
End: 2020-12-30
Payer: MEDICAID

## 2020-12-30 VITALS — SYSTOLIC BLOOD PRESSURE: 110 MMHG | WEIGHT: 167 LBS | DIASTOLIC BLOOD PRESSURE: 62 MMHG | BODY MASS INDEX: 29.58 KG/M2

## 2020-12-30 PROCEDURE — 99213 OFFICE O/P EST LOW 20 MIN: CPT | Performed by: OBSTETRICS & GYNECOLOGY

## 2020-12-30 RX ORDER — PNV NO.95/FERROUS FUM/FOLIC AC 28MG-0.8MG
1 TABLET ORAL DAILY
Qty: 90 TABLET | Refills: 2 | Status: SHIPPED | OUTPATIENT
Start: 2020-12-30

## 2020-12-30 NOTE — PROGRESS NOTES
Anjali Roche is a 30 y.o. female 34w0d        OB History    Para Term  AB Living   4 1 1   2 1   SAB TAB Ectopic Molar Multiple Live Births   2                # Outcome Date GA Lbr Guillermo/2nd Weight Sex Delivery Anes PTL Lv   4 Current            3 Term 2017    F          Birth Comments: [address] [facility]   2 SAB            1 SAB                Vitals  BP: 110/62  Weight: 167 lb (75.8 kg)  Patient Position: Sitting  Albumin: Negative  Glucose: Negative      The patient was seen and evaluated. There was positive fetal movements. No contractions or leakage of fluid. Signs and symptoms of  labor as well as labor were reviewed. The S/S of Pre-Eclampsia were reviewed with the patient in detail. She is to report any of these if they occur. She currently denies any of these. The patient had her 28 week labs completed. No visits with results within 5 Week(s) from this visit. Latest known visit with results is:   Hospital Outpatient Visit on 10/23/2020   Component Date Value Ref Range Status    Date of Birth 10/23/2020 [id number],761,[id number]   Final    Maternal Weight 10/23/2020 150   Final    Patient Weight Units 10/23/2020 LB   Final    Due Date 10/23/2020 SEE NOTE   Final    Comment: Results for Estimated Due Date: 21       Determined by 10/23/2020 Ultrasound   Final    Last Menstrual Period 10/23/2020 UNK   Final    Monochorionic Twins 10/23/2020 INFORMATION NOT PROVIDED   Final    Race (Maternal) 10/23/2020 [facility]   Final    Diabetic 10/23/2020 NEGATIVE   Final    Smoking 10/23/2020 INFORMATION NOT PROVIDED   Final    Valproic/Carbamazep 10/23/2020 INFORMATION NOT PROVIDED   Final    Fam HX NTD 10/23/2020 NEGATIVE   Final    Repeat Specimen?  10/23/2020 INFORMATION NOT PROVIDED   Final    AFP (Alpha Fetoprotein) 10/23/2020 21  ng/mL Final    AFP Mom 10/23/2020 0.62   Final    AFP Interp 10/23/2020 Screen Neg   Final    Comment: (NOTE)  INTERPRETATION:

## 2021-01-20 ENCOUNTER — ROUTINE PRENATAL (OUTPATIENT)
Dept: OBGYN CLINIC | Age: 26
End: 2021-01-20
Payer: MEDICAID

## 2021-01-20 VITALS — WEIGHT: 168 LBS | SYSTOLIC BLOOD PRESSURE: 120 MMHG | DIASTOLIC BLOOD PRESSURE: 60 MMHG | BODY MASS INDEX: 29.76 KG/M2

## 2021-01-20 DIAGNOSIS — Z3A.29 29 WEEKS GESTATION OF PREGNANCY: ICD-10-CM

## 2021-01-20 DIAGNOSIS — Z23 NEED FOR TDAP VACCINATION: ICD-10-CM

## 2021-01-20 DIAGNOSIS — M54.50 LOW BACK PAIN DURING PREGNANCY, ANTEPARTUM: ICD-10-CM

## 2021-01-20 DIAGNOSIS — Z34.82 ENCOUNTER FOR SUPERVISION OF OTHER NORMAL PREGNANCY IN SECOND TRIMESTER: Primary | ICD-10-CM

## 2021-01-20 DIAGNOSIS — O26.899 LOW BACK PAIN DURING PREGNANCY, ANTEPARTUM: ICD-10-CM

## 2021-01-20 PROCEDURE — G8419 CALC BMI OUT NRM PARAM NOF/U: HCPCS | Performed by: NURSE PRACTITIONER

## 2021-01-20 PROCEDURE — 90715 TDAP VACCINE 7 YRS/> IM: CPT | Performed by: NURSE PRACTITIONER

## 2021-01-20 PROCEDURE — 1036F TOBACCO NON-USER: CPT | Performed by: NURSE PRACTITIONER

## 2021-01-20 PROCEDURE — 90471 IMMUNIZATION ADMIN: CPT | Performed by: NURSE PRACTITIONER

## 2021-01-20 PROCEDURE — G8482 FLU IMMUNIZE ORDER/ADMIN: HCPCS | Performed by: NURSE PRACTITIONER

## 2021-01-20 PROCEDURE — G8427 DOCREV CUR MEDS BY ELIG CLIN: HCPCS | Performed by: NURSE PRACTITIONER

## 2021-01-20 PROCEDURE — 99213 OFFICE O/P EST LOW 20 MIN: CPT | Performed by: NURSE PRACTITIONER

## 2021-01-20 NOTE — PROGRESS NOTES
Presents for OB visit  Gestation 29w0d  Estimated Date of Delivery: 21    History of miscarriages (2)  CL per [facility]  Recommend progesterone 17-P (history of 21 week spontaneous  delivery  Knows gender- girl  Flu vaccine given   [facility] for delivery   Tdap given 2021               OB History    Para Term  AB Living   4 1 1   2 1   SAB TAB Ectopic Molar Multiple Live Births   2                # Outcome Date GA Lbr Guillermo/2nd Weight Sex Delivery Anes PTL Lv   4 Current            3 Term 2017    F          Birth Comments: [address] [facility]   2 SAB            1 SAB                     No Known Allergies  Current Outpatient Medications   Medication Sig Dispense Refill    Prenatal Vit-Fe Fumarate-FA (PRENATAL VITAMINS) 28-0.8 MG TABS Take 1 tablet by mouth daily 90 tablet 2    HYDROXYprogesterone caproate 250 MG/ML OIL oil injection Inject 250 mg into the muscle every 7 days      Prenatal Vit w/Td-Gxxkicogs-CE (PNV PO) Take by mouth       No current facility-administered medications for this visit. No past medical history on file. No past surgical history on file. Headaches: no  Swelling: no  Bleeding: no  Discharge: no   Dysuria: no  Nausea: no  Heartburn: no  Epigastric pain: no  Contractions: no  Leakage of fluid: no  + fetal movement       Return 3 weeks ob visit al though she states she is moving to [facility] in 10 days. She has not been following with [facility] for US for Hx PTL, she states she is using progestereon injections weekly. She has not completed 28 week labs.    Strongly advised patient to be compliant with  Fetal monitoring and labsas advised to minimize the potential of adverse  complications (eg. Fetal demise/stillbirth, polyhydramnios/oligohydramnios, fetal growth abnormalities, pregnancy loss, hypertensive disorders of pregnancy, indicated  delivery, etc) and potential maternal morbidities, etc.       Labs as indicated still

## 2021-01-20 NOTE — PROGRESS NOTES
The patient requested to have have the Tdap vaccine. Consent obtained. Injection of 0.5mL given Left deltoid Intramuscular. Lot #: [id number]  EXP: 6/21/22  . [place] [address] [phone]  VIS given at appointment  Patient tolerated well.

## 2021-01-20 NOTE — PATIENT INSTRUCTIONS

## 2021-01-22 ENCOUNTER — HOSPITAL ENCOUNTER (OUTPATIENT)
Age: 26
Setting detail: SPECIMEN
Discharge: HOME | End: 2021-01-22
Payer: MEDICAID

## 2021-01-22 DIAGNOSIS — Z34.82 ENCOUNTER FOR SUPERVISION OF OTHER NORMAL PREGNANCY IN SECOND TRIMESTER: ICD-10-CM

## 2021-01-22 DIAGNOSIS — Z3A.26 26 WEEKS GESTATION OF PREGNANCY: ICD-10-CM

## 2021-01-22 LAB
ABSOLUTE EOS #: 0.21 K/UL (ref 0–0.44)
ABSOLUTE IMMATURE GRANULOCYTE: 0.13 K/UL (ref 0–0.3)
ABSOLUTE LYMPH #: 1.41 K/UL (ref 1.1–3.7)
ABSOLUTE MONO #: 0.74 K/UL (ref 0.1–1.2)
BASOPHILS # BLD: 0 % (ref 0–2)
BASOPHILS ABSOLUTE: 0.04 K/UL (ref 0–0.2)
DIFFERENTIAL TYPE: ABNORMAL
EOSINOPHILS RELATIVE PERCENT: 2 % (ref 1–4)
GLUCOSE ADMINISTRATION: ABNORMAL
GLUCOSE TOLERANCE SCREEN 50G: 155 MG/DL (ref 70–135)
HCT VFR BLD CALC: 40 % (ref 36.3–47.1)
HEMOGLOBIN: 12.5 G/DL (ref 11.9–15.1)
IMMATURE GRANULOCYTES: 1 %
LYMPHOCYTES # BLD: 14 % (ref 24–43)
MCH RBC QN AUTO: 30 PG (ref 25.2–33.5)
MCHC RBC AUTO-ENTMCNC: 31.3 G/DL (ref 28.4–34.8)
MCV RBC AUTO: 95.9 FL (ref 82.6–102.9)
MONOCYTES # BLD: 7 % (ref 3–12)
NRBC AUTOMATED: 0 PER 100 WBC
PDW BLD-RTO: 12.9 % (ref 11.8–14.4)
PLATELET # BLD: 255 K/UL (ref 138–453)
PLATELET ESTIMATE: ABNORMAL
PMV BLD AUTO: 10.8 FL (ref 8.1–13.5)
RBC # BLD: 4.17 M/UL (ref 3.95–5.11)
RBC # BLD: ABNORMAL 10*6/UL
SEG NEUTROPHILS: 76 % (ref 36–65)
SEGMENTED NEUTROPHILS ABSOLUTE COUNT: 7.76 K/UL (ref 1.5–8.1)
WBC # BLD: 10.3 K/UL (ref 3.5–11.3)
WBC # BLD: ABNORMAL 10*3/UL

## 2021-01-29 ENCOUNTER — TELEPHONE (OUTPATIENT)
Dept: OBGYN CLINIC | Age: 26
End: 2021-01-29

## 2021-01-29 NOTE — TELEPHONE ENCOUNTER
----- Message from Teofilo An DO sent at 1/22/2021  7:42 PM EST -----  Elevated screening 1hr gtt. Please call and notify patient. Recommend diagnostic fasting 3hr gtt. Thank you.

## 2021-02-03 ENCOUNTER — ROUTINE PRENATAL (OUTPATIENT)
Dept: OBGYN CLINIC | Age: 26
End: 2021-02-03
Payer: MEDICAID

## 2021-02-03 VITALS
TEMPERATURE: 97.3 F | SYSTOLIC BLOOD PRESSURE: 110 MMHG | RESPIRATION RATE: 16 BRPM | BODY MASS INDEX: 30.11 KG/M2 | DIASTOLIC BLOOD PRESSURE: 70 MMHG | WEIGHT: 170 LBS

## 2021-02-03 DIAGNOSIS — O99.810 ABNORMAL GLUCOSE TOLERANCE IN PREGNANCY: ICD-10-CM

## 2021-02-03 DIAGNOSIS — Z34.93 NORMAL PREGNANCY IN THIRD TRIMESTER: Primary | ICD-10-CM

## 2021-02-03 DIAGNOSIS — Z3A.31 31 WEEKS GESTATION OF PREGNANCY: ICD-10-CM

## 2021-02-03 PROCEDURE — 99213 OFFICE O/P EST LOW 20 MIN: CPT | Performed by: NURSE PRACTITIONER

## 2021-02-03 PROCEDURE — G8427 DOCREV CUR MEDS BY ELIG CLIN: HCPCS | Performed by: NURSE PRACTITIONER

## 2021-02-03 PROCEDURE — G8419 CALC BMI OUT NRM PARAM NOF/U: HCPCS | Performed by: NURSE PRACTITIONER

## 2021-02-03 PROCEDURE — G8482 FLU IMMUNIZE ORDER/ADMIN: HCPCS | Performed by: NURSE PRACTITIONER

## 2021-02-03 PROCEDURE — 1036F TOBACCO NON-USER: CPT | Performed by: NURSE PRACTITIONER

## 2021-02-03 NOTE — PATIENT INSTRUCTIONS
After Hours Number: You can call the office [phone]  or [phone]  Call if you have:  1. Bleeding like a period  2. Cramps or contractions greater than 2 hours  3. If you are leaking fluid  4. If you've a fever greater than 100°  5. If you feel as if baby is not moving  6. If you have continuous vomiting over 3-4 hours   Counting Your Baby's Kicks: Care Instructions  Your Care Instructions    Counting your baby's kicks is one way your doctor can tell that your baby is healthy. Most women--especially in a first pregnancy--feel their baby move for the first time between 16 and 22 weeks. The movement may feel like flutters rather than kicks. Your baby may move more at certain times of the day. When you are active, you may notice less kicking than when you are resting. At your prenatal visits, your doctor will ask whether the baby is active. In your last trimester, your doctor may ask you to count the number of times you feel your baby move. Follow-up care is a key part of your treatment and safety. Be sure to make and go to all appointments, and call your doctor if you are having problems. It's also a good idea to know your test results and keep a list of the medicines you take. How do you count fetal kicks? · A common method of checking your baby's movement is to count the number of kicks or moves you feel in 1 hour. Ten movements (such as kicks, flutters, or rolls) in 1 hour are normal. Some doctors suggest that you count in the morning until you get to 10 movements. Then you can quit for that day and start again the next day. · Pick your baby's most active time of day to count. This may be any time from morning to evening. · If you do not feel 10 movements in an hour, your baby may be sleeping. Wait for the next hour and count again. When should you call for help?   Call your doctor now or seek immediate medical care if:    · You noticed that your baby has stopped moving or is moving much less than normal.    Watch closely for changes in your health, and be sure to contact your doctor if you have any problems. Where can you learn more? Go to https://chpepiceweb.XAware. org and sign in to your [employer] account. Enter D394 in the SyMynd box to learn more about \"Counting Your Baby's Kicks: Care Instructions. \"     If you do not have an account, please click on the \"Sign Up Now\" link. Current as of: September 5, 2018  Content Version: 12.0  © 2727-7774 [employer]. Care instructions adapted under license by [facility] ([facility]). If you have questions about a medical condition or this instruction, always ask your healthcare professional. [address] any warranty or liability for your use of this information. Preeclampsia: Care Instructions  Overview    Preeclampsia occurs when a woman's blood pressure rises during pregnancy. Often with preeclampsia, you also have swelling in your legs, hands, and face. A test may show too much protein in your urine. Preeclampsia is also called toxemia. If preeclampsia is severe and not treated, it can lead to seizures (eclampsia) and damage to your liver or kidneys. Preeclampsia can prevent your baby from getting enough food and oxygen. This can cause a low birth weight or other problems. Your doctor will watch you closely to prevent these problems. He or she also may recommend that you reduce your activity. If your preeclampsia is a danger to your health or the health of your baby, your doctor may need to deliver your baby early. While preeclampsia is a concern, most women with preeclampsia have healthy babies. After a woman gives birth, preeclampsia usually goes away on its own. But symptoms may last a few weeks or more and can get worse after delivery. Rarely, symptoms of preeclampsia don't show up until days or even weeks after childbirth. Follow-up care is a key part of your treatment and safety.  Be sure to vegetables. · If your doctor advised bed rest, be sure to stay off your feet and rest as much as possible. ? Keep a phone, phone book, notepad, and pen near the bed where you can easily reach them. ? Gently stretch your legs every hour to maintain good blood flow. ? Have another family member pack snacks and lunch food in a cooler close to your bed. ? Use this time for activities that you usually cannot find time for, such as reading, craft projects, or letter writing. · You can keep track of your baby's health by noting the length of time it takes to count 10 movements (such as kicks, flutters, or rolls). Feeling 10 movements in less than 1 hour is considered normal. Track your baby's movements once each day. Bring this record with you to each prenatal visit. When should you call for help? Call 911 anytime you think you may need emergency care. For example, call if:    · You passed out (lost consciousness). · You have a seizure. Call your doctor now or seek immediate medical care if:    · You have symptoms of preeclampsia, such as:  ? Sudden swelling of your face, hands, or feet. ? New vision problems (such as dimness or blurring). ? A severe headache. · Your blood pressure is higher than it should be, or it rises suddenly. · You have new nausea or vomiting. · You think that you are in labor. · You have pain in your belly or pelvis. Watch closely for changes in your health, and be sure to contact your doctor if:    · You gain weight rapidly. Where can you learn more? Go to [email or web address] Kinney LocksmithgabeThe [facility].Here@ Networks. org and sign in to your [employer]Sent account. Enter J162 in the KidNimble box to learn more about \"Preeclampsia: Care Instructions. \"     If you do not have an account, please click on the \"Sign Up Now\" link. Current as of: September 5, 2018  Content Version: 12.0  © 4061-1128 Healthwise, Incorporated. Care instructions adapted under license by [facility] ([facility]).  If allow others to smoke around you. If you need help quitting, talk to your doctor about stop-smoking programs and medicines. These can increase your chances of quitting for good. When should you call for help? Call 911 anytime you think you may need emergency care. For example, call if:    · You passed out (lost consciousness). · You have severe vaginal bleeding. · You have severe pain in your belly or pelvis. · You have had fluid gushing or leaking from your vagina and you know or think the umbilical cord is bulging into your vagina. If this happens, immediately get down on your knees so your rear end (buttocks) is higher than your head. This will decrease the pressure on the cord until help arrives. Call your doctor now or seek immediate medical care if:    · You have signs of preeclampsia, such as:  ? Sudden swelling of your face, hands, or feet. ? New vision problems (such as dimness or blurring). ? A severe headache. · You have any vaginal bleeding. · You have belly pain or cramping. · You have a fever. · You have had regular contractions (with or without pain) for an hour. This means that you have 6 or more within 1 hour after you change your position and drink fluids. · You have a sudden release of fluid from the vagina. · You have low back pain or pelvic pressure that does not go away. · You notice that your baby has stopped moving or is moving much less than normal.    Watch closely for changes in your health, and be sure to contact your doctor if you have any problems. Where can you learn more? Go to https://O3b NetworksgabeNouveaux Riche.Flurry. org and sign in to your [employer] account. Enter Q400 in the Roundbox box to learn more about \" Labor: Care Instructions. \"     If you do not have an account, please click on the \"Sign Up Now\" link. Current as of: 2018  Content Version: 12.0  © 6450-2926 Healthwise, [facility].  Care instructions adapted under license by [facility] ([facility]). If you have questions about a medical condition or this instruction, always ask your healthcare professional. [address] any warranty or liability for your use of this information. Patient Education        Gestational Diabetes Diet: Care Instructions  Your Care Instructions     Gestational diabetes is a form of diabetes that can happen during pregnancy. It usually goes away after the baby is born. Diabetes means that your pancreas can't make enough insulin or your body does not use insulin properly. Insulin helps sugar enter your cells, where it is used for energy. You may be able to control your blood sugar while you are pregnant by eating a healthy diet and getting regular exercise. A dietitian or certified diabetes educator (CDE) can help you make a food plan. This plan will help control your blood sugar and provide good nutrition for you and your baby. If diet and exercise don't lower or control your blood sugar, you may need diabetes medicine or insulin. Follow-up care is a key part of your treatment and safety. Be sure to make and go to all appointments, and call your doctor if you are having problems. It's also a good idea to know your test results and keep a list of the medicines you take. How can you care for yourself at home? · Learn which foods have carbohydrate. Eating too much carbohydrate will cause your blood sugar to go too high. Carbohydrate foods include:  ? Breads, cereals, pasta, and rice. ? Dried beans and starchy vegetables, like corn, peas, and potatoes. ? Fruits and fruit juice, milk, and yogurt. ? Candy, table sugar, soda pop, and drinks sweetened with sugar. · Learn how much carbohydrate you need each day. A dietitian or certified diabetes educator (CDE) can teach you how to keep track of how much carbohydrate you eat.   · Try to eat the same amount of carbohydrate at each meal. This will help keep your blood sugar steady. Do not save up your daily allowance of carbohydrate to eat at one meal.  · Limit foods that have added sugar. This includes candy, desserts, and soda pop. These foods need to be counted as part of your total carbohydrate intake for the day. · Do not drink alcohol. Alcohol is not safe for you or your baby. · Do not skip meals. Your blood sugar may drop too low if you skip meals and use insulin. · Write down what you eat every day. Review your record with your dietitian or CDE to see if you are eating the right amounts of foods. · Check your blood sugar first thing in the morning before you eat. Then check your blood sugar 1 to 2 hours after the first bite of each meal (or as your doctor recommends). This will help you see how the food you eat affects your blood sugar. Keep track of these levels. Share the record with your doctor. When should you call for help? Watch closely for changes in your health, and be sure to contact your doctor if:    · You have questions about your diet.     · You often have problems with high or low blood sugar. Where can you learn more? Go to https://TutorspreepeGetHired.com.I-Mob Holdings. org and sign in to your [employer] account. Enter M291 in the [facility] box to learn more about \"Gestational Diabetes Diet: Care Instructions. \"     If you do not have an account, please click on the \"Sign Up Now\" link. Current as of: December 20, 2019               Content Version: 12.6  © 5231-7028 [employer]. Care instructions adapted under license by [facility] ([facility]). If you have questions about a medical condition or this instruction, always ask your healthcare professional. [place] [address] any warranty or liability for your use of this information.

## 2021-02-08 ENCOUNTER — ROUTINE PRENATAL (OUTPATIENT)
Dept: PERINATAL CARE | Age: 26
End: 2021-02-08
Payer: MEDICAID

## 2021-02-08 VITALS
DIASTOLIC BLOOD PRESSURE: 73 MMHG | SYSTOLIC BLOOD PRESSURE: 120 MMHG | RESPIRATION RATE: 16 BRPM | HEART RATE: 80 BPM | BODY MASS INDEX: 30.65 KG/M2 | TEMPERATURE: 97.3 F | HEIGHT: 63 IN | WEIGHT: 173 LBS

## 2021-02-08 DIAGNOSIS — Z3A.31 31 WEEKS GESTATION OF PREGNANCY: ICD-10-CM

## 2021-02-08 DIAGNOSIS — Z13.89 ENCOUNTER FOR ROUTINE SCREENING FOR MALFORMATION USING ULTRASONICS: ICD-10-CM

## 2021-02-08 DIAGNOSIS — O35.2XX0 HEREDITARY FAMILIAL DISEASE AFFECTING MANAGEMENT OF MOTHER AND POSSIBLY AFFECTING FETUS, ANTEPARTUM, SINGLE OR UNSPECIFIED FETUS: ICD-10-CM

## 2021-02-08 DIAGNOSIS — O34.10 LEIOMYOMA IN PREGNANCY, UTERINE, ANTEPARTUM: ICD-10-CM

## 2021-02-08 DIAGNOSIS — D25.9 LEIOMYOMA IN PREGNANCY, UTERINE, ANTEPARTUM: ICD-10-CM

## 2021-02-08 DIAGNOSIS — O09.213 CURRENT PREGNANCY WITH HISTORY OF PRE-TERM LABOR IN THIRD TRIMESTER: Primary | ICD-10-CM

## 2021-02-08 LAB
ABDOMINAL CIRCUMFERENCE: NORMAL
BIPARIETAL DIAMETER: NORMAL
ESTIMATED FETAL WEIGHT: NORMAL
FEMORAL DIAMETER: NORMAL
HC/AC: NORMAL
HEAD CIRCUMFERENCE: NORMAL

## 2021-02-08 PROCEDURE — 76819 FETAL BIOPHYS PROFIL W/O NST: CPT | Performed by: OBSTETRICS & GYNECOLOGY

## 2021-02-08 PROCEDURE — 76817 TRANSVAGINAL US OBSTETRIC: CPT | Performed by: OBSTETRICS & GYNECOLOGY

## 2021-02-08 PROCEDURE — 99999 PR OFFICE/OUTPT VISIT,PROCEDURE ONLY: CPT | Performed by: OBSTETRICS & GYNECOLOGY

## 2021-02-08 PROCEDURE — 76805 OB US >/= 14 WKS SNGL FETUS: CPT | Performed by: OBSTETRICS & GYNECOLOGY

## 2021-02-12 ENCOUNTER — HOSPITAL ENCOUNTER (OUTPATIENT)
Age: 26
Setting detail: SPECIMEN
Discharge: HOME | End: 2021-02-12
Payer: MEDICAID

## 2021-02-12 DIAGNOSIS — O99.810 ABNORMAL GLUCOSE TOLERANCE IN PREGNANCY: ICD-10-CM

## 2021-02-12 LAB
3 HR GLUCOSE: 145 MG/DL (ref 65–139)
AMOUNT GLUCOSE GIVEN: 100 G
GLUCOSE FASTING: 70 MG/DL (ref 65–94)
GLUCOSE TOLERANCE TEST 1 HOUR: 175 MG/DL (ref 65–179)
GLUCOSE TOLERANCE TEST 2 HOUR: 133 MG/DL (ref 65–154)

## 2021-02-17 ENCOUNTER — ROUTINE PRENATAL (OUTPATIENT)
Dept: OBGYN CLINIC | Age: 26
End: 2021-02-17
Payer: MEDICAID

## 2021-02-17 VITALS — BODY MASS INDEX: 30.65 KG/M2 | WEIGHT: 173 LBS | DIASTOLIC BLOOD PRESSURE: 70 MMHG | SYSTOLIC BLOOD PRESSURE: 110 MMHG

## 2021-02-17 DIAGNOSIS — Z34.93 NORMAL PREGNANCY IN THIRD TRIMESTER: Primary | ICD-10-CM

## 2021-02-17 DIAGNOSIS — Z3A.33 33 WEEKS GESTATION OF PREGNANCY: ICD-10-CM

## 2021-02-17 PROCEDURE — 1036F TOBACCO NON-USER: CPT | Performed by: NURSE PRACTITIONER

## 2021-02-17 PROCEDURE — G8482 FLU IMMUNIZE ORDER/ADMIN: HCPCS | Performed by: NURSE PRACTITIONER

## 2021-02-17 PROCEDURE — G8419 CALC BMI OUT NRM PARAM NOF/U: HCPCS | Performed by: NURSE PRACTITIONER

## 2021-02-17 PROCEDURE — G8427 DOCREV CUR MEDS BY ELIG CLIN: HCPCS | Performed by: NURSE PRACTITIONER

## 2021-02-17 PROCEDURE — 99213 OFFICE O/P EST LOW 20 MIN: CPT | Performed by: NURSE PRACTITIONER

## 2021-02-17 NOTE — PROGRESS NOTES
Presents for OB visit  Gestation 33w0d  Estimated Date of Delivery: 21    History of miscarriages (2)  CL per MFM  Recommend progesterone 17-P (history of 21 week spontaneous  delivery  Knows gender- girl  Flu vaccine given   [facility] for delivery   Moving to [place] at the end of the month-/2021  Elevated 1hr gtt 151- 3hr GTT ordered has not yet completed. recommend small frequent meals with protein each meal and try to get most carbohydrates from fruits and vegetable. OB History    Para Term  AB Living   4 1 1   2 1   SAB TAB Ectopic Molar Multiple Live Births   2                # Outcome Date GA Lbr Guillermo/2nd Weight Sex Delivery Anes PTL Lv   4 Current            3 Term 2017    F          Birth Comments: [facility]   2 SAB            1 SAB                     No Known Allergies  Current Outpatient Medications   Medication Sig Dispense Refill    Prenatal Vit-Fe Fumarate-FA (PRENATAL VITAMINS) 28-0.8 MG TABS Take 1 tablet by mouth daily 90 tablet 2    HYDROXYprogesterone caproate 250 MG/ML OIL oil injection Inject 250 mg into the muscle every 7 days       No current facility-administered medications for this visit. No past medical history on file. No past surgical history on file.   Headaches: no  Swelling: no  Bleeding: no  Discharge: no   Dysuria: no  Nausea: no  Heartburn: no  Epigastric pain: no  Contractions: no  Leakage of fluid: no  + fetal movement       Return 2 WEEKS    Labs as indicated 3 hr GTT  Strongly advised patient to be compliant with blood sugar monitoring/3hr GTT as advised to minimize the potential of adverse  complications (eg. Fetal demise/stillbirth, polyhydramnios/oligohydramnios, fetal growth abnormalities, pregnancy loss, hypertensive disorders of pregnancy, indicated  delivery, etc) and potential maternal morbidities, etc.   Recommended  fasting BS <90, and 2 hr PP<120. PTL signs reviewed, if indicated  Kick Count Instructions given if indicated: The patient was instructed on fetal kick counts and was given a kick sheet to complete every 8 hours. This is to begin at 28 weeks gestation. She was instructed that the baby should move at a minimum of ten times within one hour after a meal. The patient was instructed to lay down on her left side twenty minutes after eating and count movements for up to one hour with a target value of ten movements. She was instructed to notify the office if she did not make that target after two attempts or if after any attempt there was less than four movements. After hour numbers reviewed , along with labor signs if indicated. Contractions/cramping between 5-7 minutes and persisting even with attempts of increased water and laying on side. Fluid leakage, bleeding  NST information given no    The patient was counseled on the mandatory call ahead policy. She has been instructed to call the office at anytime prior to going into the hospital so the on-call physician may direct her to the appropriate facility for care. Exceptions were reviewed including but not limited to: Decreased fetal movement, vaginal Bleeding or hemorrhage, trauma, readily expectant delivery, or any instance where she feels 911 should be utilized. Of the 20 minute duration appointment visit, Cecilio Herman CNP spent at least 50% of the face-to-face time in counseling, explanation of diagnosis, planning of further management, and answering all questions.

## 2021-02-17 NOTE — PATIENT INSTRUCTIONS
After Hours Number: You can call the office [phone]  or [phone]  Call if you have:  1. Bleeding like a period  2. Cramps or contractions greater than 2 hours  3. If you are leaking fluid  4. If you've a fever greater than 100°  5. If you feel as if baby is not moving  6. If you have continuous vomiting over 3-4 hours   Counting Your Baby's Kicks: Care Instructions  Your Care Instructions    Counting your baby's kicks is one way your doctor can tell that your baby is healthy. Most women--especially in a first pregnancy--feel their baby move for the first time between 16 and 22 weeks. The movement may feel like flutters rather than kicks. Your baby may move more at certain times of the day. When you are active, you may notice less kicking than when you are resting. At your prenatal visits, your doctor will ask whether the baby is active. In your last trimester, your doctor may ask you to count the number of times you feel your baby move. Follow-up care is a key part of your treatment and safety. Be sure to make and go to all appointments, and call your doctor if you are having problems. It's also a good idea to know your test results and keep a list of the medicines you take. How do you count fetal kicks? · A common method of checking your baby's movement is to count the number of kicks or moves you feel in 1 hour. Ten movements (such as kicks, flutters, or rolls) in 1 hour are normal. Some doctors suggest that you count in the morning until you get to 10 movements. Then you can quit for that day and start again the next day. · Pick your baby's most active time of day to count. This may be any time from morning to evening. · If you do not feel 10 movements in an hour, your baby may be sleeping. Wait for the next hour and count again. When should you call for help?   Call your doctor now or seek immediate medical care if:    · You noticed that your baby has stopped moving or is moving much less than normal.    Watch closely for changes in your health, and be sure to contact your doctor if you have any problems. Where can you learn more? Go to https://chpepiceweb.Mohound. org and sign in to your VSHORE account. Enter T779 in the Perceptis box to learn more about \"Counting Your Baby's Kicks: Care Instructions. \"     If you do not have an account, please click on the \"Sign Up Now\" link. Current as of: September 5, 2018  Content Version: 12.0  © 4353-7783 doo. Care instructions adapted under license by [facility] ([facility]). If you have questions about a medical condition or this instruction, always ask your healthcare professional. [address] any warranty or liability for your use of this information. Preeclampsia: Care Instructions  Overview    Preeclampsia occurs when a woman's blood pressure rises during pregnancy. Often with preeclampsia, you also have swelling in your legs, hands, and face. A test may show too much protein in your urine. Preeclampsia is also called toxemia. If preeclampsia is severe and not treated, it can lead to seizures (eclampsia) and damage to your liver or kidneys. Preeclampsia can prevent your baby from getting enough food and oxygen. This can cause a low birth weight or other problems. Your doctor will watch you closely to prevent these problems. He or she also may recommend that you reduce your activity. If your preeclampsia is a danger to your health or the health of your baby, your doctor may need to deliver your baby early. While preeclampsia is a concern, most women with preeclampsia have healthy babies. After a woman gives birth, preeclampsia usually goes away on its own. But symptoms may last a few weeks or more and can get worse after delivery. Rarely, symptoms of preeclampsia don't show up until days or even weeks after childbirth. Follow-up care is a key part of your treatment and safety.  Be sure to make and go to all appointments, and call your doctor if you are having problems. It's also a good idea to know your test results and keep a list of the medicines you take. How can you care for yourself at home? · Take and record your blood pressure at home if your doctor tells you to. ? Learn the importance of the two measures of blood pressure (such as 120 over 80, or 120/80). The first number is the systolic pressure, which is the force of blood on the artery walls as the heart pumps. The second number is the diastolic pressure, which is the force of blood on the artery walls between heartbeats, when the heart is at rest. You have a choice of monitors to use. ? Manual monitor: You pump up the cuff and use a stethoscope to listen for your pulse. ? Electronic monitor: The cuff inflates, and a gauge shows your pulse rate. ? To take your blood pressure:  ? Ask your doctor to check your blood pressure monitor to be sure that it is accurate and that the cuff fits you. Also ask your doctor to watch you to make sure that you are using it right. ? You should not eat, use tobacco products, or use medicine known to raise blood pressure (such as some nasal decongestant sprays) before you take your blood pressure. ? Avoid taking your blood pressure if you have just exercised. Also avoid taking it if you are nervous or upset. Rest at least 15 minutes before you take your blood pressure. · If your doctor advises, check the protein levels in your urine. Your doctor or nurse will show you how to do this. · Take your medicines exactly as prescribed. Call your doctor if you think you are having a problem with your medicine. · Do not smoke. Quitting smoking will help improve your baby's growth and health. If you need help quitting, talk to your doctor about stop-smoking programs and medicines. These can increase your chances of quitting for good.   · Eat a balanced and healthy diet that has lots of fruits and vegetables. · If your doctor advised bed rest, be sure to stay off your feet and rest as much as possible. ? Keep a phone, phone book, notepad, and pen near the bed where you can easily reach them. ? Gently stretch your legs every hour to maintain good blood flow. ? Have another family member pack snacks and lunch food in a cooler close to your bed. ? Use this time for activities that you usually cannot find time for, such as reading, craft projects, or letter writing. · You can keep track of your baby's health by noting the length of time it takes to count 10 movements (such as kicks, flutters, or rolls). Feeling 10 movements in less than 1 hour is considered normal. Track your baby's movements once each day. Bring this record with you to each prenatal visit. When should you call for help? Call 911 anytime you think you may need emergency care. For example, call if:    · You passed out (lost consciousness). · You have a seizure. Call your doctor now or seek immediate medical care if:    · You have symptoms of preeclampsia, such as:  ? Sudden swelling of your face, hands, or feet. ? New vision problems (such as dimness or blurring). ? A severe headache. · Your blood pressure is higher than it should be, or it rises suddenly. · You have new nausea or vomiting. · You think that you are in labor. · You have pain in your belly or pelvis. Watch closely for changes in your health, and be sure to contact your doctor if:    · You gain weight rapidly. Where can you learn more? Go to https://Catalyst Energy TechnologygabeRecoup.3Scan. org and sign in to your Ideacentric account. Enter M440 in the careersmore box to learn more about \"Preeclampsia: Care Instructions. \"     If you do not have an account, please click on the \"Sign Up Now\" link. Current as of: September 5, 2018  Content Version: 12.0  © 4760-6447 Healthwise, Incorporated. Care instructions adapted under license by [facility] ([facility]).  If you have questions about a medical condition or this instruction, always ask your healthcare professional. [address] any warranty or liability for your use of this information.  Labor: Care Instructions  Your Care Instructions     labor is the start of labor between 21 and 36 weeks of pregnancy. A full-term pregnancy lasts 37 to 42 weeks. In labor, the uterus contracts to open the cervix. This is the first stage of childbirth.  labor can be caused by a problem with the baby, the mother, or both. Often the cause is not known. In some cases, doctors use medicines to try to delay labor until 29 or more weeks of pregnancy. By this time, a baby has grown enough so that problems are not likely. In some cases--such as with a serious infection--it is healthier for the baby to be born early. Your treatment will depend on how far along you are in your pregnancy and on your health and your baby's health. Follow-up care is a key part of your treatment and safety. Be sure to make and go to all appointments, and call your doctor if you are having problems. It's also a good idea to know your test results and keep a list of the medicines you take. How can you care for yourself at home? · If your doctor prescribed medicines, take them exactly as directed. Call your doctor if you think you are having a problem with your medicine. · Rest until your doctor advises you about activity. He or she will tell you if you should stay in bed most of the time. You may need to arrange for  if you have young children. · Do not have sexual intercourse unless your doctor says it is safe. · Use pads, not tampons, if you have vaginal bleeding. · Make sure to drink plenty of fluids. Dehydration can lead to contractions. If you have kidney, heart, or liver disease and have to limit fluids, talk with your doctor before you increase the amount of fluids you drink.   · Do not smoke or blood sugar steady. Do not save up your daily allowance of carbohydrate to eat at one meal.  · Limit foods that have added sugar. This includes candy, desserts, and soda pop. These foods need to be counted as part of your total carbohydrate intake for the day. · Do not drink alcohol. Alcohol is not safe for you or your baby. · Do not skip meals. Your blood sugar may drop too low if you skip meals and use insulin. · Write down what you eat every day. Review your record with your dietitian or CDE to see if you are eating the right amounts of foods. · Check your blood sugar first thing in the morning before you eat. Then check your blood sugar 1 to 2 hours after the first bite of each meal (or as your doctor recommends). This will help you see how the food you eat affects your blood sugar. Keep track of these levels. Share the record with your doctor. When should you call for help? Watch closely for changes in your health, and be sure to contact your doctor if:    · You have questions about your diet.     · You often have problems with high or low blood sugar. Where can you learn more? Go to https://AdspringrpeVideoGenie.Cortina Systems. org and sign in to your World Blender account. Enter M291 in the [facility] box to learn more about \"Gestational Diabetes Diet: Care Instructions. \"     If you do not have an account, please click on the \"Sign Up Now\" link. Current as of: December 20, 2019               Content Version: 12.6  © 0623-6882 [employer], [employer]orporated. Care instructions adapted under license by [facility] ([facility]). If you have questions about a medical condition or this instruction, always ask your healthcare professional. [place] [address] any warranty or liability for your use of this information.